# Patient Record
Sex: FEMALE
[De-identification: names, ages, dates, MRNs, and addresses within clinical notes are randomized per-mention and may not be internally consistent; named-entity substitution may affect disease eponyms.]

---

## 2017-06-08 ENCOUNTER — HOSPITAL ENCOUNTER (EMERGENCY)
Dept: HOSPITAL 31 - C.ER | Age: 47
Discharge: HOME | End: 2017-06-08
Payer: COMMERCIAL

## 2017-06-08 VITALS — HEART RATE: 69 BPM | RESPIRATION RATE: 17 BRPM

## 2017-06-08 VITALS — BODY MASS INDEX: 27.3 KG/M2

## 2017-06-08 VITALS — SYSTOLIC BLOOD PRESSURE: 114 MMHG | TEMPERATURE: 98.3 F | DIASTOLIC BLOOD PRESSURE: 83 MMHG

## 2017-06-08 VITALS — OXYGEN SATURATION: 100 %

## 2017-06-08 DIAGNOSIS — R10.30: Primary | ICD-10-CM

## 2017-06-08 LAB
ALBUMIN/GLOB SERPL: 1.6 {RATIO} (ref 1–2.1)
ALP SERPL-CCNC: 72 U/L (ref 38–126)
ALT SERPL-CCNC: 29 U/L (ref 9–52)
AST SERPL-CCNC: 17 U/L (ref 14–36)
BACTERIA #/AREA URNS HPF: (no result) /[HPF]
BASOPHILS # BLD AUTO: 0.1 K/UL (ref 0–0.2)
BASOPHILS NFR BLD: 0.9 % (ref 0–2)
BILIRUB SERPL-MCNC: 0.5 MG/DL (ref 0.2–1.3)
BILIRUB UR-MCNC: NEGATIVE MG/DL
BUN SERPL-MCNC: 10 MG/DL (ref 7–17)
CALCIUM SERPL-MCNC: 8.3 MG/DL (ref 8.6–10.4)
CHLORIDE SERPL-SCNC: 102 MMOL/L (ref 98–107)
CO2 SERPL-SCNC: 22 MMOL/L (ref 22–30)
EOSINOPHIL # BLD AUTO: 0.1 K/UL (ref 0–0.7)
EOSINOPHIL NFR BLD: 1.2 % (ref 0–4)
ERYTHROCYTE [DISTWIDTH] IN BLOOD BY AUTOMATED COUNT: 13.6 % (ref 11.5–14.5)
GLOBULIN SER-MCNC: 2.6 GM/DL (ref 2.2–3.9)
GLUCOSE SERPL-MCNC: 85 MG/DL (ref 65–105)
GLUCOSE UR STRIP-MCNC: NORMAL MG/DL
HCT VFR BLD CALC: 40.5 % (ref 34–47)
KETONES UR STRIP-MCNC: (no result) MG/DL
LEUKOCYTE ESTERASE UR-ACNC: (no result) LEU/UL
LYMPHOCYTES # BLD AUTO: 1.9 K/UL (ref 1–4.3)
LYMPHOCYTES NFR BLD AUTO: 24.5 % (ref 20–40)
MCH RBC QN AUTO: 28.9 PG (ref 27–31)
MCHC RBC AUTO-ENTMCNC: 33 G/DL (ref 33–37)
MCV RBC AUTO: 87.6 FL (ref 81–99)
MONOCYTES # BLD: 0.4 K/UL (ref 0–0.8)
MONOCYTES NFR BLD: 5.7 % (ref 0–10)
NRBC BLD AUTO-RTO: 0 % (ref 0–2)
PH UR STRIP: 5 [PH] (ref 5–8)
PLATELET # BLD: 259 K/UL (ref 130–400)
PMV BLD AUTO: 9.8 FL (ref 7.2–11.7)
POTASSIUM SERPL-SCNC: 4 MMOL/L (ref 3.6–5.2)
PROT SERPL-MCNC: 6.7 G/DL (ref 6.3–8.3)
PROT UR STRIP-MCNC: NEGATIVE MG/DL
RBC # UR STRIP: NEGATIVE /UL
RBC #/AREA URNS HPF: 1 /HPF (ref 0–3)
SODIUM SERPL-SCNC: 135 MMOL/L (ref 132–148)
SP GR UR STRIP: 1.02 (ref 1–1.03)
UROBILINOGEN UR-MCNC: NORMAL MG/DL (ref 0.2–1)
WBC # BLD AUTO: 7.8 K/UL (ref 4.8–10.8)
WBC #/AREA URNS HPF: < 1 /HPF (ref 0–5)

## 2017-06-08 PROCEDURE — 87491 CHLMYD TRACH DNA AMP PROBE: CPT

## 2017-06-08 PROCEDURE — 76830 TRANSVAGINAL US NON-OB: CPT

## 2017-06-08 PROCEDURE — 76856 US EXAM PELVIC COMPLETE: CPT

## 2017-06-08 PROCEDURE — 83690 ASSAY OF LIPASE: CPT

## 2017-06-08 PROCEDURE — 96374 THER/PROPH/DIAG INJ IV PUSH: CPT

## 2017-06-08 PROCEDURE — 87591 N.GONORRHOEAE DNA AMP PROB: CPT

## 2017-06-08 PROCEDURE — 84703 CHORIONIC GONADOTROPIN ASSAY: CPT

## 2017-06-08 PROCEDURE — 99285 EMERGENCY DEPT VISIT HI MDM: CPT

## 2017-06-08 PROCEDURE — 81001 URINALYSIS AUTO W/SCOPE: CPT

## 2017-06-08 PROCEDURE — 80053 COMPREHEN METABOLIC PANEL: CPT

## 2017-06-08 PROCEDURE — 85025 COMPLETE CBC W/AUTO DIFF WBC: CPT

## 2017-06-08 PROCEDURE — 74177 CT ABD & PELVIS W/CONTRAST: CPT

## 2017-06-08 NOTE — US
Pelvic ultrasound 



History: Pelvic pain. 



Comparison: None available. 



Technique: Multi-echo multiplanar sequences were performed through 

pelvis utilizing transabdominal and transvaginal techniques. 



Findings: 



Uterus: 10.4 x 4.7 x 5.9 centimeters.  Anteverted. 



Endometrium measures 7 millimeters. 



Cervix measures 3.2 centimeters. 



No free fluid in the pelvic cul-de-sac. 



Trace free fluid noted within the cervical canal. 



Right ovary: 3.6 x 2.5 x 3.6 centimeters. Normal flow.  Hypoechoic 

cyst measuring 2.2 x 2.6 x 2.3 centimeters. 



Left ovary: 2.5 x 2.0 x 2.4 centimeters. Normal flow. 



Pregnancy status unknown. 



Impression: 



2.4 centimeter right ovarian cyst. 



Trace free fluid noted within the cervical canal.

## 2017-06-08 NOTE — CT
PROCEDURE:  CT Abdomen and Pelvis with oral and  IV contrast.



HISTORY:

low abdominal pain



COMPARISON:

None available.



TECHNIQUE:

Contiguous axial images of the abdomen and pelvis. Oral and IV 

contrast was administered. Coronal and Sagittal reformats generated 

and reviewed. 



Contrast dose: 100 cc Visipaque 320 



Radiation dose:



Total exam DLP = 1024.51 mGy-cm.



This CT exam was performed using one or more of the following dose 

reduction techniques: Automated exposure control, adjustment of the 

mA and/or kV according to patient size, and/or use of iterative 

reconstruction technique.



FINDINGS:





LOWER THORAX:

No visible consolidation, pleural effusion, or pneumothorax.



LIVER:

Unremarkable. 



GALLBLADDER AND BILE DUCTS:

Unremarkable. 



PANCREAS:

Unremarkable. 



SPLEEN:

8 mm probable splenule.  Otherwise unremarkable. 



ADRENALS:

Unremarkable. 



KIDNEYS AND URETERS:

The kidneys enhance symmetrically. No hydronephrosis or obstructing 

renal calculus. 



BLADDER:

Decompressed urinary bladder precludes adequate evaluation.



REPRODUCTIVE:

Uterus is present. 2.1 cm right adnexal cystic lesion, likely ovarian 

cyst. 



APPENDIX:

The appendix appears within normal limits of caliber. No secondary 

signs of acute appendicitis.



BOWEL:

The stomach is nondistended. 



The bowel loops appear within normal limits of caliber without 

evidence of intestinal obstruction. Colonic wall thickening involving 

the distal transverse and left colon worrisome for colitis (i.e. 

infectious, inflammatory, ischemic). 



PERITONEUM:

No significant free fluid. No definite free air.



LYMPH NODES:

No bulky lymphadenopathy identified.



VASCULATURE:

No aortic aneurysm. 



BONES:

No acute osseous abnormality is detected.



OTHER FINDINGS:

Tiny fat containing umbilical hernia. 



IMPRESSION:

Colonic wall thickening involving the distal transverse and left 

colon worrisome for colitis (i.e. infectious, inflammatory, 

ischemic). Correlate clinically. 



2.1 cm right adnexal cystic lesion, likely ovarian cyst.

## 2017-06-08 NOTE — C.PDOC
History Of Present Illness


47 yr old female presents to the ER with complaints of low abdominal pain for 

the past 5 weeks. Patient states she has had multiple PID in the past and this 

feels similar, however the pain got worse last week. Patient states she also 

had some white discharge and went to go see her PMD who gave her Diflucan and 

recommended to follow up with GYN but failed to do so. Patient states the pain 

is getting worse, radiating to back and now feels weak. Patient denies fever, 

chest pain, SOB, nausea, vomiting, diarrhea, constipation or numbness. 


Time Seen by Provider: 06/08/17 08:40


Chief Complaint (Nursing): Abdominal Pain


History Per: Patient


History/Exam Limitations: no limitations


Onset/Duration Of Symptoms: Days (5 weeks)





Past Medical History


Reviewed: Historical Data, Nursing Documentation, Vital Signs


Vital Signs: 


 Last Vital Signs











Temp  98.3 F   06/08/17 16:50


 


Pulse  69   06/08/17 16:50


 


Resp  17   06/08/17 16:50


 


BP  114/83   06/08/17 16:50


 


Pulse Ox  100   06/08/17 18:44














- Medical History


PMH: Anxiety, COPD, Post Traumatic Stress Disorder


Family History: States: No Known Family Hx





- Social History


Hx Tobacco Use: No


Hx Alcohol Use: Yes (''Occassionally'')


Hx Substance Use: No (''Past abuse of Xanax'')





- Immunization History


Hx Tetanus Toxoid Vaccination: Yes


Hx Influenza Vaccination: No


Hx Pneumococcal Vaccination: No





Review Of Systems


Except As Marked, All Systems Reviewed And Found Negative.


Constitutional: Positive for: Weakness.  Negative for: Fever


Cardiovascular: Negative for: Chest Pain


Respiratory: Negative for: Shortness of Breath


Gastrointestinal: Positive for: Abdominal Pain (Low abdominal pain ).  Negative 

for: Nausea, Vomiting, Diarrhea, Constipation


Genitourinary: Positive for: Vaginal Discharge (White)


Musculoskeletal: Positive for: Back Pain (Radiating pain to the back)


Neurological: Negative for: Numbness





Physical Exam





- Physical Exam


Appears: Non-toxic, No Acute Distress


Skin: Warm, Dry, No Rash


Head: Atraumatic, Normacephalic


Oral Mucosa: Moist


Chest: Symmetrical, No Tenderness


Cardiovascular: Rhythm Regular, No Murmur


Respiratory: Normal Breath Sounds, No Rales, No Wheezing


Gastrointestinal/Abdominal: Soft, Tenderness (Lower abdominal tenderness), 

Distention (Mildly ), No Guarding, No Rebound


Pelvic: Normal Speculum Exam, No Vaginal Discharge, Other (No cervicitis. On 

manual exam, patient is tender. )


Extremity: Normal ROM, No Swelling


Neurological/Psych: Oriented x3, Normal Speech, Normal Motor





ED Course And Treatment





- Laboratory Results


Result Diagrams: 


 06/08/17 10:34





 06/08/17 11:29


O2 Sat by Pulse Oximetry: 100





- CT Scan/US


  ** US - Pelvis/Transvaginal


Other Rad Studies (CT/US): Read By Radiologist, Radiology Report Reviewed


CT/US Interpretation: Pelvic ultrasound.  History: Pelvic pain.  Comparison: 

None available.  Technique: Multi-echo multiplanar sequences were performed 

through pelvis utilizing transabdominal and transvaginal techniques.  Findings:

  Uterus: 10.4 x 4.7 x 5.9 centimeters.  Anteverted.  Endometrium measures 7 

millimeters.  Cervix measures 3.2 centimeters.  No free fluid in the pelvic cul-

de-sac.  Trace free fluid noted within the cervical canal.  Right ovary: 3.6 x 

2.5 x 3.6 centimeters. Normal flow.  Hypoechoic cyst measuring 2.2 x 2.6 x 2.3 

centimeters.  Left ovary: 2.5 x 2.0 x 2.4 centimeters. Normal flow.  Pregnancy 

status unknown.  Impression:  2.4 centimeter right ovarian cyst.  Trace free 

fluid noted within the cervical canal.





  ** CT - Abdomen & Pelvis 


Other Rad Studies (CT/US): Read By Radiologist, Radiology Report Reviewed


CT/US Interpretation: PROCEDURE:  CT Abdomen and Pelvis with oral and  IV 

contrast.  HISTORY:  low abdominal pain.  COMPARISON:  None available.  

TECHNIQUE:  Contiguous axial images of the abdomen and pelvis. Oral and IV 

contrast was administered. Coronal and Sagittal reformats generated and 

reviewed.  Contrast dose: 100 cc Visipaque 320.  Radiation dose:  Total exam 

DLP = 1024.51 mGy-cm.  This CT exam was performed using one or more of the 

following dose reduction techniques: Automated exposure control, adjustment of 

the mA and/or kV according to patient size, and/or use of iterative 

reconstruction technique.  FINDINGS:  LOWER THORAX:  No visible consolidation, 

pleural effusion, or pneumothorax.  LIVER:  Unremarkable.  GALLBLADDER AND BILE 

DUCTS:  Unremarkable.  PANCREAS:  Unremarkable.  SPLEEN:  8 mm probable 

splenule.  Otherwise unremarkable.  ADRENALS:  Unremarkable.  KIDNEYS AND 

URETERS:  The kidneys enhance symmetrically. No hydronephrosis or obstructing 

renal calculus.  BLADDER:  Decompressed urinary bladder precludes adequate 

evaluation.  REPRODUCTIVE:  Uterus is present. 2.1 cm right adnexal cystic 

lesion, likely ovarian cyst.  APPENDIX:  The appendix appears within normal 

limits of caliber. No secondary signs of acute appendicitis.  BOWEL:  The 

stomach is nondistended.  The bowel loops appear within normal limits of 

caliber without evidence of intestinal obstruction. Colonic wall thickening 

involving the distal transverse and left colon worrisome for colitis (i.e. 

infectious, inflammatory, ischemic).  PERITONEUM:  No significant free fluid. 

No definite free air.  LYMPH NODES:  No bulky lymphadenopathy identified.  

VASCULATURE:  No aortic aneurysm.  BONES:  No acute osseous abnormality is 

detected.  OTHER FINDINGS:  Tiny fat containing umbilical hernia.  IMPRESSION:  

Colonic wall thickening involving the distal transverse and left colon 

worrisome for colitis (i.e. infectious, inflammatory, ischemic). Correlate 

clinically.  2.1 cm right adnexal cystic lesion, likely ovarian cyst.


Progress Note: casew as d/w Infectious ds  who instructed to start 

patient on levaquin/Avelox  plus Flagyl. Patient was d/c home with f/u with GI 

and OBGYN. Patient verbalized understanding and agreed with the plan of 

discharging.





Medical Decision Making


Medical Decision Making: 


PLAN:


* US - Pelvis/Transvaginal 


* CT - Abd & Pelvis 


* CBC 


* Chlamydia GC 


* HCG 


* Urinalysis 


* Toradol IVP


* Sodium Chloride IV 








Disposition





- Disposition


Disposition: HOME/ ROUTINE


Disposition Time: 16:42


Condition: STABLE


Additional Instructions: 


FOLLOW UP WITH PMD WITHIN 1-2 DAYS. RETURN TO ED IF FEEL WORSE.


Prescriptions: 


metroNIDAZOLE [Flagyl] 500 mg PO TID #42 tab


levoFLOXacin [Levaquin] 750 mg PO DAILY #14 tab


Ibuprofen [Motrin Tab] 600 mg PO Q8 #30 tab


Instructions:  Abdominal Pain (ED)


Forms:  Work Excuse





- Clinical Impression


Clinical Impression: 


 Abdominal pain








- PA / NP / Resident Statement


MD/DO has reviewed & agrees with the documentation as recorded.





- Scribe Statement


The provider has reviewed the documentation as recorded by the Scribe


Barbara Maddox





All medical record entries made by the Scribe were at my direction and 

personally dictated by me. I have reviewed the chart and agree that the record 

accurately reflects my personal performance of the history, physical exam, 

medical decision making, and the department course for this patient. I have 

also personally directed, reviewed, and agree with the discharge instructions 

and disposition.

## 2017-09-03 ENCOUNTER — HOSPITAL ENCOUNTER (EMERGENCY)
Dept: HOSPITAL 31 - C.ER | Age: 47
Discharge: HOME | End: 2017-09-03
Payer: MEDICAID

## 2017-09-03 VITALS — TEMPERATURE: 98.1 F

## 2017-09-03 VITALS
DIASTOLIC BLOOD PRESSURE: 73 MMHG | RESPIRATION RATE: 18 BRPM | OXYGEN SATURATION: 99 % | HEART RATE: 66 BPM | SYSTOLIC BLOOD PRESSURE: 112 MMHG

## 2017-09-03 VITALS — BODY MASS INDEX: 27.3 KG/M2

## 2017-09-03 DIAGNOSIS — Z3A.01: ICD-10-CM

## 2017-09-03 DIAGNOSIS — O46.8X1: Primary | ICD-10-CM

## 2017-09-03 LAB
ALBUMIN/GLOB SERPL: 1.3 {RATIO} (ref 1–2.1)
ALP SERPL-CCNC: 60 U/L (ref 38–126)
ALT SERPL-CCNC: 29 U/L (ref 9–52)
AST SERPL-CCNC: 14 U/L (ref 14–36)
BASOPHILS # BLD AUTO: 0.1 K/UL (ref 0–0.2)
BASOPHILS NFR BLD: 1.3 % (ref 0–2)
BILIRUB SERPL-MCNC: 0.3 MG/DL (ref 0.2–1.3)
BILIRUB UR-MCNC: NEGATIVE MG/DL
BUN SERPL-MCNC: 11 MG/DL (ref 7–17)
CALCIUM SERPL-MCNC: 9.1 MG/DL (ref 8.6–10.4)
CHLORIDE SERPL-SCNC: 103 MMOL/L (ref 98–107)
CO2 SERPL-SCNC: 25 MMOL/L (ref 22–30)
EOSINOPHIL # BLD AUTO: 0.1 K/UL (ref 0–0.7)
EOSINOPHIL NFR BLD: 1.4 % (ref 0–4)
ERYTHROCYTE [DISTWIDTH] IN BLOOD BY AUTOMATED COUNT: 13 % (ref 11.5–14.5)
GLOBULIN SER-MCNC: 2.8 GM/DL (ref 2.2–3.9)
GLUCOSE SERPL-MCNC: 88 MG/DL (ref 65–105)
GLUCOSE UR STRIP-MCNC: NORMAL MG/DL
HCT VFR BLD CALC: 39 % (ref 34–47)
KETONES UR STRIP-MCNC: NEGATIVE MG/DL
LEUKOCYTE ESTERASE UR-ACNC: (no result) LEU/UL
LYMPHOCYTES # BLD AUTO: 1.7 K/UL (ref 1–4.3)
LYMPHOCYTES NFR BLD AUTO: 20 % (ref 20–40)
MCH RBC QN AUTO: 28.5 PG (ref 27–31)
MCHC RBC AUTO-ENTMCNC: 32.7 G/DL (ref 33–37)
MCV RBC AUTO: 87.2 FL (ref 81–99)
MONOCYTES # BLD: 0.5 K/UL (ref 0–0.8)
MONOCYTES NFR BLD: 5.5 % (ref 0–10)
NRBC BLD AUTO-RTO: 0 % (ref 0–2)
PH UR STRIP: 5 [PH] (ref 5–8)
PLATELET # BLD: 234 K/UL (ref 130–400)
PMV BLD AUTO: 9.6 FL (ref 7.2–11.7)
POTASSIUM SERPL-SCNC: 4.1 MMOL/L (ref 3.6–5.2)
PROT SERPL-MCNC: 6.5 G/DL (ref 6.3–8.3)
PROT UR STRIP-MCNC: (no result) MG/DL
RBC # UR STRIP: (no result) /UL
RBC #/AREA URNS HPF: 1236 /HPF (ref 0–3)
SODIUM SERPL-SCNC: 139 MMOL/L (ref 132–148)
SP GR UR STRIP: 1.02 (ref 1–1.03)
UROBILINOGEN UR-MCNC: NORMAL MG/DL (ref 0.2–1)
WBC # BLD AUTO: 8.3 K/UL (ref 4.8–10.8)
WBC #/AREA URNS HPF: 1 /HPF (ref 0–5)

## 2017-09-03 PROCEDURE — 86850 RBC ANTIBODY SCREEN: CPT

## 2017-09-03 PROCEDURE — 76856 US EXAM PELVIC COMPLETE: CPT

## 2017-09-03 PROCEDURE — 96360 HYDRATION IV INFUSION INIT: CPT

## 2017-09-03 PROCEDURE — 81001 URINALYSIS AUTO W/SCOPE: CPT

## 2017-09-03 PROCEDURE — 99284 EMERGENCY DEPT VISIT MOD MDM: CPT

## 2017-09-03 PROCEDURE — 84702 CHORIONIC GONADOTROPIN TEST: CPT

## 2017-09-03 PROCEDURE — 86900 BLOOD TYPING SEROLOGIC ABO: CPT

## 2017-09-03 PROCEDURE — 80053 COMPREHEN METABOLIC PANEL: CPT

## 2017-09-03 PROCEDURE — 76830 TRANSVAGINAL US NON-OB: CPT

## 2017-09-03 PROCEDURE — 84703 CHORIONIC GONADOTROPIN ASSAY: CPT

## 2017-09-03 PROCEDURE — 85025 COMPLETE CBC W/AUTO DIFF WBC: CPT

## 2017-09-03 NOTE — US
HISTORY:

pregnant/bleeding, last menstrual period is reported 08/01/2017. 



COMPARISON:

And pelvis CT examination with contrast 06/08/2017 and transvaginal 

pelvic ultrasonography on the same date.



TECHNIQUE:

Transabdominal and transvaginal pelvic ultrasound were performed for 

evaluation of pregnancy and spontaneous vaginal bleeding.



FINDINGS:



UTERUS:

Measures 10.1 x 4.7 x 5.4 cm. Normal in size and appearance. No 

fibroid or other mass lesion seen.



ENDOMETRIUM:

Measures 8.1 mm in diameter. No intrauterine gestation is identified. 



CERVIX:

No cervical abnormality identified.



RIGHT OVARY:

Measures 2.3 x 1.3 x 2.3 cm. No solid mass. Normal flow. 



LEFT OVARY:

Measures 2.2 x 1.1 x 2.0 cm. No solid mass. Normal flow. 



FREE FLUID:

No significant free fluid noted.



OTHER FINDINGS:

None. 



IMPRESSION:

No intrauterine gestation is appreciate this time. There is no 

definite pattern to indicate an ectopic gestation either. An ectopic 

gestation is not excluded however, given patient's last menstrual 

period suggesting an gestational age of 4 weeks 5 days, an early 

intrauterine gestation remains of a possibility, simply not visualize 

currently. Clinical and sonographic follow-up are advised.

## 2017-09-03 NOTE — C.PDOC
History Of Present Illness


47 year old female presents to the ED with complaints of spotting beginning 

yesterday, and slight worsening today and pelvic cramping. Patient's LMP was 

 and in early desirable pregnancy. She is  and had one . 

Patient denies fever, nausea, or vomiting. 


Time Seen by Provider: 17 09:59


Chief Complaint (Nursing): Female Genitourinary


History Per: Patient


History/Exam Limitations: no limitations


Onset/Duration Of Symptoms: Days (1 day )


Current Symptoms Are (Timing): Still Present


Quality Of Discomfort: Cramping


Associated Symptoms: denies: Fever, Chills, Nausea, Vomiting, Diarrhea


Recent travel outside of the [place]: No


Abnormal Vaginal Bleeding: Yes


Last Menstral Period:  


: 3


Para: 1


Miscarriage: 1 ( )





Past Medical History


Reviewed: Historical Data, Nursing Documentation, Vital Signs


Vital Signs: 


 Last Vital Signs











Temp  98.1 F   17 11:30


 


Pulse  66   17 11:30


 


Resp  18   17 11:30


 


BP  112/73   17 11:30


 


Pulse Ox  99   17 13:34














- Medical History


PMH: Anxiety, COPD, Post Traumatic Stress Disorder


Family History: States: Unknown Family Hx





- Social History


Hx Tobacco Use: No


Hx Alcohol Use: Yes (''Occassionally'')


Hx Substance Use: No (''Past abuse of Xanax'')





- Immunization History


Hx Tetanus Toxoid Vaccination: Yes


Hx Influenza Vaccination: No


Hx Pneumococcal Vaccination: No





Review Of Systems


Constitutional: Negative for: Fever, Chills


Cardiovascular: Negative for: Chest Pain, Palpitations


Respiratory: Negative for: Cough, Shortness of Breath


Gastrointestinal: Negative for: Nausea, Vomiting, Diarrhea


Genitourinary: Positive for: Vaginal Bleeding, Pelvic Pain (cramping )





Physical Exam





- Physical Exam


Appears: Non-toxic, No Acute Distress


Skin: Warm, Dry


Head: Atraumatic


Eye(s): bilateral: Normal Inspection


Oral Mucosa: Moist


Neck: Supple


Chest: Symmetrical, No Deformity


Cardiovascular: Rhythm Regular


Respiratory: Normal Breath Sounds, No Rales, No Rhonchi, No Wheezing


Gastrointestinal/Abdominal: Soft, Tenderness (mild suprapubic tenderness ), No 

Distention, No Guarding, No Rebound


Neurological/Psych: Oriented x3, Normal Speech, Normal Cognition





ED Course And Treatment





- Laboratory Results


Result Diagrams: 


 17 10:30





 17 10:30


O2 Sat by Pulse Oximetry: 99 (room air )





- CT Scan/US


  ** Pelvic US


Other Rad Studies (CT/US): Read By Radiologist, Radiology Report Reviewed


CT/US Interpretation: Accession No. : [id number].  Patient Name / ID : 

RUPA CHOW  / [id number].  Exam Date : 2017 10:31:16 ( Approved ).  

Study Comment :  Sex / Age : F  / 47Y.  Creator : Sergio Mills MD.  

Dictator : Sergio Mills MD.   :  Approver : Sergio Mills MD.  Approver2 :  Report Date : 2017 11:11:50.  My Comment :  ****

*******************************************************************************

.  HISTORY:  pregnant/bleeding, last menstrual period is reported 2017.  

COMPARISON:  And pelvis CT examination with contrast 2017 and 

transvaginal pelvic ultrasonography on the same date.  TECHNIQUE:  

Transabdominal and transvaginal pelvic ultrasound were performed for evaluation 

of pregnancy and spontaneous vaginal bleeding.  FINDINGS:  UTERUS:  Measures 

10.1 x 4.7 x 5.4 cm. Normal in size and appearance. No fibroid or other mass 

lesion seen.  ENDOMETRIUM:  Measures 8.1 mm in diameter. No intrauterine 

gestation is identified.  CERVIX:  No cervical abnormality identified.  RIGHT 

OVARY:  Measures 2.3 x 1.3 x 2.3 cm. No solid mass. Normal flow.  LEFT OVARY:  

Measures 2.2 x 1.1 x 2.0 cm. No solid mass. Normal flow.  FREE FLUID:  No 

significant free fluid noted.  OTHER FINDINGS:  None.  IMPRESSION:  No 

intrauterine gestation is appreciate this time. There is no definite pattern to 

indicate an ectopic gestation either. An ectopic gestation is not excluded 

however, given patient's last menstrual period suggesting an gestational age of 

4 weeks 5 days, an early intrauterine gestation remains of a possibility, 

simply not visualize currently. Clinical and sonographic follow-up are advised.





- Physician Consult Information


Time Consulting Physician Contacted: 10:00


Physician Contacted: Lisa Sloan


Outcome Of Conversation: Case discussed with Dr. Sloan and US results were 

discussed. Dr. Sloan agrees it was most likely early pregnancy, still 

possibility og ectopic pregnancy and suggested patient follow up in 48 hours 

for repeat Beta.





Disposition





- Disposition


Disposition: HOME/ ROUTINE


Disposition Time: 12:32


Condition: STABLE


Additional Instructions: 


Follow up with PMD/OBGYN within 1-2 days. REturn to ED in 48 h to repeat Beta 

HCG. Return to ED immediately if feel worse.


Instructions:  First Trimester Vaginal Bleed (ED)


Forms:  Work/School/Gym Excuse, [employer] (English)





- Clinical Impression


Clinical Impression: 


 Vaginal bleeding during pregnancy, antepartum








- Scribe Statement


The provider has reviewed the documentation as recorded by the Scribe





Jessy Buckley





All medical record entries made by the Scribe were at my direction and 

personally dictated by me. I have reviewed the chart and agree that the record 

accurately reflects my personal performance of the history, physical exam, 

medical decision making, and the department course for this patient. I have 

also personally directed, reviewed, and agree with the discharge instructions 

and disposition.

## 2017-09-06 ENCOUNTER — HOSPITAL ENCOUNTER (EMERGENCY)
Dept: HOSPITAL 31 - C.ER | Age: 47
Discharge: HOME | End: 2017-09-06
Payer: COMMERCIAL

## 2017-09-06 VITALS — BODY MASS INDEX: 27.3 KG/M2

## 2017-09-06 VITALS — TEMPERATURE: 98.1 F | OXYGEN SATURATION: 98 %

## 2017-09-06 VITALS — RESPIRATION RATE: 20 BRPM | DIASTOLIC BLOOD PRESSURE: 81 MMHG | HEART RATE: 80 BPM | SYSTOLIC BLOOD PRESSURE: 129 MMHG

## 2017-09-06 DIAGNOSIS — N93.8: Primary | ICD-10-CM

## 2017-09-06 NOTE — C.PDOC
History Of Present Illness


Patient is a 47 year old female presents to ED for repeat Beta. Prior records 

reviewed, pt was seen here 3 days ago. Pregnancy test was positive but Beta was 

11 at that time. Pelvic ultrasound from 9/3/17 showed no intrauterine 

gestation. Pt states she continues to have vaginal bleeding, and crampy lower 

abdominal pain. No nausea, vomiting, diarrhea, or fever. 





Time Seen by Provider: 09/06/17 07:36


Chief Complaint (Nursing): Abdominal Pain


History Per: Patient


History/Exam Limitations: no limitations


Onset/Duration Of Symptoms: Days


Current Symptoms Are (Timing): Still Present


Location Of Pain/Discomfort: Suprapubic


Radiation Of Pain To:: None


Quality Of Discomfort: Cramping


Associated Symptoms: denies: Loss Of Appetite, Back Pain, Chest Pain, 

Constipation, Urinary Symptoms


Exacerbating Factors: None


Alleviating Factors: None


Recent travel outside of the [place]: No


Additional History Per: Patient


Abnormal Vaginal Bleeding: Yes





Past Medical History


Reviewed: Historical Data, Nursing Documentation, Vital Signs


Vital Signs: 


 Last Vital Signs











Temp  98.1 F   09/06/17 07:05


 


Pulse  80   09/06/17 09:15


 


Resp  20   09/06/17 09:15


 


BP  129/81   09/06/17 09:15


 


Pulse Ox  98   09/06/17 09:16














- Medical History


PMH: Anxiety, COPD, Post Traumatic Stress Disorder


Family History: States: Unknown Family Hx





- Social History


Hx Tobacco Use: No


Hx Alcohol Use: No (''Occassionally'')


Hx Substance Use: No (''Past abuse of Xanax'')





- Immunization History


Hx Tetanus Toxoid Vaccination: Yes


Hx Influenza Vaccination: No


Hx Pneumococcal Vaccination: No





Review Of Systems


Except As Marked, All Systems Reviewed And Found Negative.


Constitutional: Negative for: Fever, Chills


Gastrointestinal: Positive for: Abdominal Pain.  Negative for: Nausea, Vomiting

, Diarrhea, Constipation


Genitourinary: Positive for: Vaginal Bleeding.  Negative for: Dysuria


Musculoskeletal: Negative for: Back Pain





Physical Exam





- Physical Exam


Appears: Non-toxic, No Acute Distress


Skin: Normal Color, Warm, Dry


Head: Atraumatic, Normacephalic


Oral Mucosa: Moist


Cardiovascular: Rhythm Regular, No Murmur


Respiratory: Normal Breath Sounds, No Rales, No Rhonchi, No Wheezing


Gastrointestinal/Abdominal: Soft, Tenderness (suprapubic), No Guarding, No 

Rebound


Extremity: Normal ROM


Neurological/Psych: Oriented x3, Normal Speech





ED Course And Treatment


O2 Sat by Pulse Oximetry: 98 (on RA)


Pulse Ox Interpretation: Normal


Progress Note: Beta-HCG ordered and reviewed.  Beta  Hcg 5.  On re-evaluation 

abdomen soft non-tender


Reassessment Condition: Unchanged





Medical Decision Making


Medical Decision Making: 





Patient advised to follow up with GYN or clinic for further evaluation





Impression DUB vs Miscarrage





Disposition





- Disposition


Referrals: 


[facility] [Outside]


[facility] [Outside]


Disposition: HOME/ ROUTINE


Disposition Time: 09:20


Condition: STABLE


Additional Instructions: 


Return to ED if any increase symptoms


Follow up with PMD or clinic for further evaluation


Instructions:  Dysfunctional Uterine Bleeding (ED)


Forms:  [employer] (English), Work Excuse





- POA


Present On Arrival: None





- Clinical Impression


Clinical Impression: 


 DUB (dysfunctional uterine bleeding)








- PA / NP / Resident Statement


MD/DO has reviewed & agrees with the documentation as recorded.





- Scribe Statement


The provider has reviewed the documentation as recorded by the Scribwesley Sloan





All medical record entries made by the Rellibwesley were at my direction and 

personally dictated by me. I have reviewed the chart and agree that the record 

accurately reflects my personal performance of the history, physical exam, 

medical decision making, and the department course for this patient. I have 

also personally directed, reviewed, and agree with the discharge instructions 

and disposition.

## 2017-11-16 ENCOUNTER — HOSPITAL ENCOUNTER (EMERGENCY)
Dept: HOSPITAL 31 - C.ER | Age: 47
Discharge: HOME | End: 2017-11-16
Payer: COMMERCIAL

## 2017-11-16 VITALS — RESPIRATION RATE: 18 BRPM | HEART RATE: 60 BPM | DIASTOLIC BLOOD PRESSURE: 75 MMHG | SYSTOLIC BLOOD PRESSURE: 115 MMHG

## 2017-11-16 VITALS — BODY MASS INDEX: 27.3 KG/M2

## 2017-11-16 VITALS — OXYGEN SATURATION: 98 %

## 2017-11-16 VITALS — TEMPERATURE: 97.8 F

## 2017-11-16 DIAGNOSIS — K59.00: Primary | ICD-10-CM

## 2017-11-16 DIAGNOSIS — R10.9: ICD-10-CM

## 2017-11-16 LAB
ALBUMIN/GLOB SERPL: 1.2 {RATIO} (ref 1–2.1)
ALP SERPL-CCNC: 69 U/L (ref 38–126)
ALT SERPL-CCNC: 48 U/L (ref 9–52)
AST SERPL-CCNC: 21 U/L (ref 14–36)
BASOPHILS # BLD AUTO: 0 K/UL (ref 0–0.2)
BASOPHILS NFR BLD: 0.5 % (ref 0–2)
BILIRUB SERPL-MCNC: 0.6 MG/DL (ref 0.2–1.3)
BILIRUB UR-MCNC: NEGATIVE MG/DL
BUN SERPL-MCNC: 12 MG/DL (ref 7–17)
CALCIUM SERPL-MCNC: 9 MG/DL (ref 8.6–10.4)
CHLORIDE SERPL-SCNC: 99 MMOL/L (ref 98–107)
CO2 SERPL-SCNC: 23 MMOL/L (ref 22–30)
EOSINOPHIL # BLD AUTO: 0.1 K/UL (ref 0–0.7)
EOSINOPHIL NFR BLD: 1.2 % (ref 0–4)
ERYTHROCYTE [DISTWIDTH] IN BLOOD BY AUTOMATED COUNT: 13.5 % (ref 11.5–14.5)
GLOBULIN SER-MCNC: 3.7 GM/DL (ref 2.2–3.9)
GLUCOSE SERPL-MCNC: 77 MG/DL (ref 65–105)
GLUCOSE UR STRIP-MCNC: NORMAL MG/DL
HCT VFR BLD CALC: 39.7 % (ref 34–47)
KETONES UR STRIP-MCNC: NEGATIVE MG/DL
LEUKOCYTE ESTERASE UR-ACNC: (no result) LEU/UL
LYMPHOCYTES # BLD AUTO: 2.2 K/UL (ref 1–4.3)
LYMPHOCYTES NFR BLD AUTO: 24.7 % (ref 20–40)
MCH RBC QN AUTO: 29.1 PG (ref 27–31)
MCHC RBC AUTO-ENTMCNC: 33.7 G/DL (ref 33–37)
MCV RBC AUTO: 86.4 FL (ref 81–99)
MONOCYTES # BLD: 0.6 K/UL (ref 0–0.8)
MONOCYTES NFR BLD: 6.4 % (ref 0–10)
NRBC BLD AUTO-RTO: 0 % (ref 0–2)
PH UR STRIP: 5 [PH] (ref 5–8)
PLATELET # BLD: 260 K/UL (ref 130–400)
PMV BLD AUTO: 9.5 FL (ref 7.2–11.7)
POTASSIUM SERPL-SCNC: 3.8 MMOL/L (ref 3.6–5.2)
PROT SERPL-MCNC: 8.3 G/DL (ref 6.3–8.3)
PROT UR STRIP-MCNC: NEGATIVE MG/DL
RBC # UR STRIP: NEGATIVE /UL
RBC #/AREA URNS HPF: < 1 /HPF (ref 0–3)
SODIUM SERPL-SCNC: 134 MMOL/L (ref 132–148)
SP GR UR STRIP: 1.01 (ref 1–1.03)
UROBILINOGEN UR-MCNC: NORMAL MG/DL (ref 0.2–1)
WBC # BLD AUTO: 8.9 K/UL (ref 4.8–10.8)

## 2017-11-16 PROCEDURE — 83690 ASSAY OF LIPASE: CPT

## 2017-11-16 PROCEDURE — 96374 THER/PROPH/DIAG INJ IV PUSH: CPT

## 2017-11-16 PROCEDURE — 81001 URINALYSIS AUTO W/SCOPE: CPT

## 2017-11-16 PROCEDURE — 80053 COMPREHEN METABOLIC PANEL: CPT

## 2017-11-16 PROCEDURE — 84703 CHORIONIC GONADOTROPIN ASSAY: CPT

## 2017-11-16 PROCEDURE — 96375 TX/PRO/DX INJ NEW DRUG ADDON: CPT

## 2017-11-16 PROCEDURE — 99285 EMERGENCY DEPT VISIT HI MDM: CPT

## 2017-11-16 PROCEDURE — 85025 COMPLETE CBC W/AUTO DIFF WBC: CPT

## 2017-11-16 PROCEDURE — 74022 RADEX COMPL AQT ABD SERIES: CPT

## 2017-11-16 PROCEDURE — 96361 HYDRATE IV INFUSION ADD-ON: CPT

## 2017-11-16 PROCEDURE — 74177 CT ABD & PELVIS W/CONTRAST: CPT

## 2017-11-16 NOTE — CT
PROCEDURE:  CT Abdomen and Pelvis with contrast



HISTORY:

lower abd pain, distended, diarrhea



COMPARISON:

Obstructive series performed 6/8/17, pelvic ultrasound performed 

9/3/17 



TECHNIQUE:

Contrast dose: 100 cc Visipaque 320 



Radiation dose:



Total exam DLP = 959.84 MGy-cm.



This CT exam was performed using one or more of the following dose 

reduction techniques: Automated exposure control, adjustment of the 

mA and/or kV according to patient size, and/or use of iterative 

reconstruction technique.



FINDINGS:



LOWER THORAX:

No visible consolidation, pleural effusion, or pneumothorax.



LIVER:

Unremarkable.  



GALLBLADDER AND BILE DUCTS:

Unremarkable. 



PANCREAS:

Unremarkable. 



SPLEEN:

Unremarkable. 



ADRENALS:

Unremarkable. 



KIDNEYS AND URETERS:

Kidneys enhance symmetrically.  No hydronephrosis or obstructing 

calculus identified. 



VASCULATURE:

No aortic aneurysm. 



BOWEL:

Stomach is nondistended.  



Lack of oral contrast limits evaluation for bowel pathology.  Bowel 

loops appear within normal limits of caliber without evidence of 

obstruction.



APPENDIX:

The appendix appears within normal limits of caliber. No secondary 

signs of acute appendicitis.



PERITONEUM:

No significant free fluid. No definite free air. 



LYMPH NODES:

No bulky adenopathy identified. 



BLADDER:

Decompressed urinary bladder limits evaluation. 



REPRODUCTIVE:

Uterus is present. The cervix appears enlarged/ bulky ; suggest 

further evaluation with pelvic ultrasound and Pap smear. 



BONES:

No acute osseous abnormality is detected. 



OTHER FINDINGS:

None.



IMPRESSION:

Mild constipation. 



The cervix appears enlarged/ bulky ; suggest further evaluation with 

pelvic exam and Pap smear.

## 2017-11-16 NOTE — C.PDOC
History Of Present Illness


47 year old female, whose PMHx includes colitis, presents to the ED for 

evaluation of cramping abdominal pain which began around 4 days ago. Patient 

states her pain occasionally radiates into her back, is associated with 

diarrhea (around 6 episodes of loose diarrhea per day), and hot/cold flashes. 

Patient states she was recently evaluated by her GYN for a Pap Smear and was 

advised to come to the ED for further evaluation. Patient denies fever, chills, 

nausea, vomiting. 


Time Seen by Provider: 11/16/17 12:05


Chief Complaint (Nursing): Abdominal Pain


History Per: Patient


History/Exam Limitations: no limitations


Current Symptoms Are (Timing): Still Present


Quality Of Discomfort: Cramping, "Pain"


Associated Symptoms: Diarrhea.  denies: Nausea, Vomiting





Past Medical History


Reviewed: Historical Data, Nursing Documentation, Vital Signs


Vital Signs: 


 Last Vital Signs











Temp  97.8 F   11/16/17 10:58


 


Pulse  60   11/16/17 14:58


 


Resp  18   11/16/17 14:58


 


BP  115/75   11/16/17 14:58


 


Pulse Ox  98   11/16/17 15:04














- Medical History


PMH: Anxiety, COPD, Post Traumatic Stress Disorder


Surgical History: No Surg Hx


Family History: States: Unknown Family Hx





- Social History


Hx Tobacco Use: No


Hx Alcohol Use: No (''Occassionally'')


Hx Substance Use: No (''Past abuse of Xanax'')





- Immunization History


Hx Tetanus Toxoid Vaccination: Yes


Hx Influenza Vaccination: No


Hx Pneumococcal Vaccination: No





Physical Exam





- Physical Exam


Appears: Non-toxic, No Acute Distress


Skin: Normal Color, Warm, Dry


Eye(s): bilateral: Normal Inspection


Oral Mucosa: Moist


Neck: Supple


Chest: Symmetrical, No Deformity, No Tenderness


Cardiovascular: Rhythm Regular, No Murmur


Respiratory: Normal Breath Sounds, No Rales, No Rhonchi, No Wheezing


Gastrointestinal/Abdominal: No Soft (firm ), Tenderness (lower abdomen), 

Distention, No Guarding, No Rebound


Back: Normal Inspection, No Vertebral Tenderness, No Paraspinal Tenderness


Pelvic: Normal External Exam


Extremity: Normal ROM, Capillary Refill (less than 2 seconds )


Neurological/Psych: Oriented x3, Normal Speech, Normal Cognition


Gait: Steady





ED Course And Treatment





- Laboratory Results


Result Diagrams: 


 11/16/17 13:02





 11/16/17 13:02


O2 Sat by Pulse Oximetry: 98 (on RA)


Pulse Ox Interpretation: Normal





- Other Rad


  ** Abdomen Obstructive Series XR


X-Ray: Interpreted by Me, Viewed By Me, Read By Radiologist


Interpretation: PROCEDURE:  Radiographs of the chest and abdomen (obstructive 

series).  HISTORY:  abd pain.  COMPARISON:  CT abdomen and pelvis with contrast 

performed 6/8/17.  TECHNIQUE:  AP radiograph of the chest, with upright and 

supine radiographs of the abdomen.  FINDINGS:  Examination limited by habitus.  

CHEST:  Heart size appears within normal limits.  No focal consolidation, 

significant pleural effusion, or definite pneumothorax identified.Please note 

that chest x-ray has limited sensitivity for the detection of pulmonary masses.

  ABDOMEN AND PELVIS:  Nonobstructive bowel gas pattern. No definite free air. 

Mild constipation.  No acute osseous abnormality is detected.  IMPRESSION:  

Mild constipation.





- CT Scan/US


  ** CT A/P


Other Rad Studies (CT/US): Interpreted By Me, Read By Radiologist, Radiology 

Report Reviewed


CT/US Interpretation: PROCEDURE:  CT Abdomen and Pelvis with contrast.  HISTORY

:  lower abd pain, distended, diarrhea.  COMPARISON:  Obstructive series 

performed 6/8/17, pelvic ultrasound performed 9/3/17.  TECHNIQUE:  Contrast dose

: 100 cc Visipaque 320.  Radiation dose:  Total exam DLP = 959.84 MGy-cm.  This 

CT exam was performed using one or more of the following dose reduction 

techniques: Automated exposure control, adjustment of the mA and/or kV 

according to patient size, and/or use of iterative reconstruction technique.  

FINDINGS:  LOWER THORAX:  No visible consolidation, pleural effusion, or 

pneumothorax.  LIVER:  Unremarkable.  GALLBLADDER AND BILE DUCTS:  

Unremarkable.  PANCREAS:  Unremarkable.  SPLEEN:  Unremarkable.  ADRENALS:  

Unremarkable.  KIDNEYS AND URETERS:  Kidneys enhance symmetrically.  No 

hydronephrosis or obstructing calculus identified.  VASCULATURE:  No aortic 

aneurysm.  BOWEL:  Stomach is nondistended.  Lack of oral contrast limits 

evaluation for bowel pathology.  Bowel loops appear within normal limits of 

caliber without evidence of obstruction.  APPENDIX:  The appendix appears 

within normal limits of caliber. No secondary signs of acute appendicitis.  

PERITONEUM:  No significant free fluid. No definite free air.  LYMPH NODES:  No 

bulky adenopathy identified.  BLADDER:  Decompressed urinary bladder limits 

evaluation.  REPRODUCTIVE:  Uterus is present. The cervix appears enlarged/ 

bulky ; suggest further evaluation with pelvic ultrasound and Pap smear.  BONES

:  No acute osseous abnormality is detected.  OTHER FINDINGS:  None.  IMPRESSION

:  Mild constipation.  The cervix appears enlarged/ bulky ; suggest further 

evaluation with pelvic exam and Pap smear.





Medical Decision Making


Medical Decision Making: 





Bloodwork, urinalysis, CT A/P, Obstructive Series Abdomen. 


Pepcid IVP, Toradol IVP, Zofran IVP, and IV Fluids administered. 





Disposition





- Disposition


Referrals: 


[facility] [Outside]


Disposition: HOME/ ROUTINE


Disposition Time: 15:22


Condition: GOOD


Additional Instructions: 


Follow up with the medical doctor within 1-2 days, Return if worsened. 


Prescriptions: 


Ibuprofen [Motrin] 600 mg PO TID #21 tab


Polyethylene Glycol 3350 [Miralax] 17 gm PO DAILY PRN #100 ml


 PRN Reason: Constipation


Instructions:  Constipation (DC), High Fiber Diet (ED)


Forms:  [facility] (English)





- Clinical Impression


Clinical Impression: 


 Abdominal pain, Constipation








- PA / NP / Resident Statement


MD/DO has reviewed & agrees with the documentation as recorded.





- Scribe Statement


The provider has reviewed the documentation as recorded by the Scribe (Dariela Sloan)








All medical record entries made by the Scribe were at my direction and 

personally dictated by me. I have reviewed the chart and agree that the record 

accurately reflects my personal performance of the history, physical exam, 

medical decision making, and the department course for this patient. I have 

also personally directed, reviewed, and agree with the discharge instructions 

and disposition.

## 2017-11-16 NOTE — RAD
PROCEDURE:  Radiographs of the chest and abdomen (obstructive series)



HISTORY:

abd pain  



COMPARISON:

CT abdomen and pelvis with contrast performed 6/8/17



TECHNIQUE:

AP radiograph of the chest, with upright and supine radiographs of 

the abdomen.



FINDINGS:

Examination limited by habitus. 



CHEST:

Heart size appears within normal limits. 



No focal consolidation, significant pleural effusion, or definite 

pneumothorax identified.Please note that chest x-ray has limited 

sensitivity for the detection of pulmonary masses.



ABDOMEN AND PELVIS:

Nonobstructive bowel gas pattern. No definite free air. Mild 

constipation. 



No acute osseous abnormality is detected. 



IMPRESSION:

Mild constipation.

## 2018-04-28 ENCOUNTER — HOSPITAL ENCOUNTER (EMERGENCY)
Dept: HOSPITAL 31 - C.ER | Age: 48
Discharge: HOME | End: 2018-04-28
Payer: MEDICAID

## 2018-04-28 VITALS
RESPIRATION RATE: 18 BRPM | SYSTOLIC BLOOD PRESSURE: 130 MMHG | HEART RATE: 84 BPM | OXYGEN SATURATION: 96 % | DIASTOLIC BLOOD PRESSURE: 87 MMHG | TEMPERATURE: 97.5 F

## 2018-04-28 VITALS — BODY MASS INDEX: 33.4 KG/M2

## 2018-04-28 DIAGNOSIS — N72: Primary | ICD-10-CM

## 2018-04-28 DIAGNOSIS — N89.8: ICD-10-CM

## 2018-04-28 LAB
BILIRUB UR-MCNC: NEGATIVE MG/DL
GLUCOSE UR STRIP-MCNC: NORMAL MG/DL
HCG,QUALITATIVE URINE: NEGATIVE
LEUKOCYTE ESTERASE UR-ACNC: (no result) LEU/UL
PH UR STRIP: 5 [PH] (ref 5–8)
PROT UR STRIP-MCNC: NEGATIVE MG/DL
RBC # UR STRIP: NEGATIVE /UL
SP GR UR STRIP: 1.02 (ref 1–1.03)
SQUAMOUS EPITHIAL: 7 /HPF (ref 0–5)
UROBILINOGEN UR-MCNC: NORMAL MG/DL (ref 0.2–1)

## 2018-04-28 NOTE — C.PDOC
History Of Present Illness


48 year old female presents to the emergency department with complaints of 

pelvic discomfort and vaginal itching with discharge persisting for the past 

two weeks. Patient states that she has a previous history of pelvic 

inflammatory disease, and she denies fever, nausea, vomiting, diarrhea, and 

vaginal bleeding. 


Time Seen by Provider: 04/28/18 08:17


Chief Complaint (Nursing): Female Genitourinary


History Per: Patient


Onset/Duration Of Symptoms: Other (2 weeks)


Current Symptoms Are (Timing): Still Present


Associated Symptoms: denies: Fever, Nausea, Vomiting, Diarrhea





Past Medical History


Reviewed: Historical Data, Nursing Documentation, Vital Signs


Vital Signs: 


 Last Vital Signs











Temp  97.5 F L  04/28/18 08:28


 


Pulse  84   04/28/18 08:28


 


Resp  18   04/28/18 08:28


 


BP  130/87   04/28/18 08:28


 


Pulse Ox  96   04/28/18 11:04














- Medical History


PMH: Anxiety, COPD, Post Traumatic Stress Disorder


Surgical History: No Surg Hx


Family History: States: No Known Family Hx





- Social History


Hx Tobacco Use: No


Hx Alcohol Use: No (''Occassionally'')


Hx Substance Use: No (''Past abuse of Xanax'')





- Immunization History


Hx Tetanus Toxoid Vaccination: No


Hx Influenza Vaccination: No


Hx Pneumococcal Vaccination: No





Review Of Systems


Except As Marked, All Systems Reviewed And Found Negative.


Genitourinary: Positive for: Vaginal Discharge, Pelvic Pain (discomfort), Other 

(vaginal itch)





Physical Exam





- Physical Exam


Appears: Non-toxic, No Acute Distress, Other (comfortable)


Cardiovascular: Rhythm Regular


Respiratory: Normal Breath Sounds


Gastrointestinal/Abdominal: Normal Exam, Soft, No Tenderness


Pelvic: No Vaginal Bleeding, Vaginal Discharge (thick, white), No Cervical 

Motion Tenderness, No Adnexal Tenderness, No Other (lesions)





ED Course And Treatment


O2 Sat by Pulse Oximetry: 96 (RA)


Pulse Ox Interpretation: Normal


Progress Note: Plan: Urinalysis results are negative. Patient prescribed 

antibiotics and is clear for discharge home.





Disposition


Counseled Patient/Family Regarding: Diagnosis, Need For Followup, Rx Given





- Disposition


Referrals: 


Ronaldo Medina MD [Staff Provider] - 


Disposition: HOME/ ROUTINE


Disposition Time: 09:15


Condition: STABLE


Additional Instructions: 


FOLLOW UP WITH YOUR GYN AS SCHEDULED THIS WEEK





USE ANTIBIOTICS UNIL FINISHED 





RETURN TO ER IF SYMPTOMS WORSEN


Prescriptions: 


levoFLOXacin [Levaquin] 500 mg PO BID #14 tab


metroNIDAZOLE [Flagyl] 500 mg PO BID #14 tab


Instructions:  Vaginal Discharge in Adults


Forms:  [facility] (English)


Print Language: ENGLISH





- POA


Present On Arrival: None





- Clinical Impression


Clinical Impression: 


 Vaginal discharge, Cervicitis








- Scribe Statement


The provider has reviewed the documentation as recorded by the Scribe (Gomez Cano)


Provider Attestation: 


All medical record entries made by the Scribe were at my direction and 

personally dictated by me. I have reviewed the chart and agree that the record 

accurately reflects my personal performance of the history, physical exam, 

medical decision making, and the department course for this patient. I have 

also personally directed, reviewed, and agree with the discharge instructions 

and disposition.

## 2018-07-26 ENCOUNTER — HOSPITAL ENCOUNTER (OUTPATIENT)
Dept: HOSPITAL 31 - C.ER | Age: 48
Setting detail: OBSERVATION
LOS: 4 days | Discharge: HOME | End: 2018-07-30
Attending: INTERNAL MEDICINE | Admitting: INTERNAL MEDICINE
Payer: COMMERCIAL

## 2018-07-26 VITALS — BODY MASS INDEX: 31.4 KG/M2

## 2018-07-26 DIAGNOSIS — Z86.711: ICD-10-CM

## 2018-07-26 DIAGNOSIS — M19.90: ICD-10-CM

## 2018-07-26 DIAGNOSIS — F43.10: ICD-10-CM

## 2018-07-26 DIAGNOSIS — K21.9: ICD-10-CM

## 2018-07-26 DIAGNOSIS — R07.89: Primary | ICD-10-CM

## 2018-07-26 DIAGNOSIS — J44.9: ICD-10-CM

## 2018-07-26 DIAGNOSIS — Z87.440: ICD-10-CM

## 2018-07-26 LAB
ALBUMIN SERPL-MCNC: 4.4 G/DL (ref 3.5–5)
ALBUMIN/GLOB SERPL: 1.7 {RATIO} (ref 1–2.1)
ALT SERPL-CCNC: 25 U/L (ref 9–52)
APTT BLD: 31 SECONDS (ref 21–34)
AST SERPL-CCNC: 18 U/L (ref 14–36)
BACTERIA #/AREA URNS HPF: (no result) /[HPF]
BASOPHILS # BLD AUTO: 0 K/UL (ref 0–0.2)
BASOPHILS NFR BLD: 0.3 % (ref 0–2)
BILIRUB UR-MCNC: NEGATIVE MG/DL
BUN SERPL-MCNC: 16 MG/DL (ref 7–17)
CALCIUM SERPL-MCNC: 9.5 MG/DL (ref 8.6–10.4)
CAOX CRY #/AREA URNS HPF: (no result) /HPF
EOSINOPHIL # BLD AUTO: 0.1 K/UL (ref 0–0.7)
EOSINOPHIL NFR BLD: 0.9 % (ref 0–4)
ERYTHROCYTE [DISTWIDTH] IN BLOOD BY AUTOMATED COUNT: 13.3 % (ref 11.5–14.5)
GFR NON-AFRICAN AMERICAN: > 60
GLUCOSE UR STRIP-MCNC: NORMAL MG/DL
HCG,QUALITATIVE URINE: NEGATIVE
HGB BLD-MCNC: 13 G/DL (ref 11–16)
INR PPP: 1.1
LEUKOCYTE ESTERASE UR-ACNC: (no result) LEU/UL
LYMPHOCYTES # BLD AUTO: 3.2 K/UL (ref 1–4.3)
LYMPHOCYTES NFR BLD AUTO: 28.4 % (ref 20–40)
MCH RBC QN AUTO: 30.1 PG (ref 27–31)
MCHC RBC AUTO-ENTMCNC: 33.9 G/DL (ref 33–37)
MCV RBC AUTO: 88.7 FL (ref 81–99)
MONOCYTES # BLD: 0.8 K/UL (ref 0–0.8)
MONOCYTES NFR BLD: 7.3 % (ref 0–10)
NEUTROPHILS # BLD: 7.1 K/UL (ref 1.8–7)
NEUTROPHILS NFR BLD AUTO: 63.1 % (ref 50–75)
NRBC BLD AUTO-RTO: 0 % (ref 0–2)
PH UR STRIP: 5 [PH] (ref 5–8)
PLATELET # BLD: 292 K/UL (ref 130–400)
PMV BLD AUTO: 9.7 FL (ref 7.2–11.7)
PROT UR STRIP-MCNC: NEGATIVE MG/DL
PROTHROMBIN TIME: 11.8 SECONDS (ref 9.7–12.2)
RBC # BLD AUTO: 4.32 MIL/UL (ref 3.8–5.2)
RBC # UR STRIP: NEGATIVE /UL
SP GR UR STRIP: 1.03 (ref 1–1.03)
SQUAMOUS EPITHIAL: 26 /HPF (ref 0–5)
UROBILINOGEN UR-MCNC: NORMAL MG/DL (ref 0.2–1)
WBC # BLD AUTO: 11.3 K/UL (ref 4.8–10.8)

## 2018-07-26 PROCEDURE — 80061 LIPID PANEL: CPT

## 2018-07-26 PROCEDURE — 85378 FIBRIN DEGRADE SEMIQUANT: CPT

## 2018-07-26 PROCEDURE — 80346 BENZODIAZEPINES1-12: CPT

## 2018-07-26 PROCEDURE — 93005 ELECTROCARDIOGRAM TRACING: CPT

## 2018-07-26 PROCEDURE — 80324 DRUG SCREEN AMPHETAMINES 1/2: CPT

## 2018-07-26 PROCEDURE — 84484 ASSAY OF TROPONIN QUANT: CPT

## 2018-07-26 PROCEDURE — 84703 CHORIONIC GONADOTROPIN ASSAY: CPT

## 2018-07-26 PROCEDURE — 93970 EXTREMITY STUDY: CPT

## 2018-07-26 PROCEDURE — 72040 X-RAY EXAM NECK SPINE 2-3 VW: CPT

## 2018-07-26 PROCEDURE — 73721 MRI JNT OF LWR EXTRE W/O DYE: CPT

## 2018-07-26 PROCEDURE — 84443 ASSAY THYROID STIM HORMONE: CPT

## 2018-07-26 PROCEDURE — 80358 DRUG SCREENING METHADONE: CPT

## 2018-07-26 PROCEDURE — 85730 THROMBOPLASTIN TIME PARTIAL: CPT

## 2018-07-26 PROCEDURE — 36415 COLL VENOUS BLD VENIPUNCTURE: CPT

## 2018-07-26 PROCEDURE — 96374 THER/PROPH/DIAG INJ IV PUSH: CPT

## 2018-07-26 PROCEDURE — 80349 CANNABINOIDS NATURAL: CPT

## 2018-07-26 PROCEDURE — 80361 OPIATES 1 OR MORE: CPT

## 2018-07-26 PROCEDURE — 81001 URINALYSIS AUTO W/SCOPE: CPT

## 2018-07-26 PROCEDURE — 85025 COMPLETE CBC W/AUTO DIFF WBC: CPT

## 2018-07-26 PROCEDURE — 80053 COMPREHEN METABOLIC PANEL: CPT

## 2018-07-26 PROCEDURE — 93306 TTE W/DOPPLER COMPLETE: CPT

## 2018-07-26 PROCEDURE — 83992 ASSAY FOR PHENCYCLIDINE: CPT

## 2018-07-26 PROCEDURE — 85610 PROTHROMBIN TIME: CPT

## 2018-07-26 PROCEDURE — 71046 X-RAY EXAM CHEST 2 VIEWS: CPT

## 2018-07-26 PROCEDURE — 80345 DRUG SCREENING BARBITURATES: CPT

## 2018-07-26 PROCEDURE — 80353 DRUG SCREENING COCAINE: CPT

## 2018-07-26 PROCEDURE — 99284 EMERGENCY DEPT VISIT MOD MDM: CPT

## 2018-07-26 NOTE — C.PDOC
History Of Present Illness


48 -year-old female with PMHx of COPD and depression presents to the ED with 

chest tightness since yesterday. Patient also reports feeling stressed out.  

Patient states she received an injection in her right knee on 7/25/18. She also 

complains of paresthesias going down to her hands.





Time Seen by Provider: 07/26/18 16:30


Chief Complaint (Nursing): Chest Pain


History Per: Patient


History/Exam Limitations: no limitations


Onset/Duration Of Symptoms: Days


Current Symptoms Are (Timing): Still Present (intermittent)


Alleviating Factors: None


Recent travel outside of the [place]: No





Past Medical History


Vital Signs: 


 Last Vital Signs











Temp  98 F   07/26/18 16:21


 


Pulse  69   07/26/18 18:35


 


Resp  12   07/26/18 18:35


 


BP  118/80   07/26/18 18:35


 


Pulse Ox  98   07/26/18 18:35














- Medical History


PMH: Anxiety, Arthritis, COPD, Post Traumatic Stress Disorder, Pulmonary 

Embolism


Other PMH: Endometriosis


Other Surgeries: Endometriosis removed


Family History: States: Unknown Family Hx





- Social History


Hx Tobacco Use: No


Hx Alcohol Use: No (''Occassionally'')


Hx Substance Use: No (''Past abuse of Xanax'')





- Immunization History


Hx Tetanus Toxoid Vaccination: No


Hx Influenza Vaccination: No


Hx Pneumococcal Vaccination: No





Review Of Systems


Except As Marked, All Systems Reviewed And Found Negative.


Constitutional: Negative for: Fever


Cardiovascular: Positive for: Chest Pain


Respiratory: Negative for: Cough, Shortness of Breath


Gastrointestinal: Negative for: Nausea, Vomiting


Neurological: Positive for: Other (paresthesia).  Negative for: Weakness, 

Headache, Dizziness





Physical Exam





- Physical Exam


Appears: Well, Non-toxic, No Acute Distress


Skin: Normal Color, Warm, Dry


Head: Atraumatic, Normacephalic


Eye(s): bilateral: PERRL, EOMI


Nose: Normal


Oral Mucosa: Moist


Throat: Normal


Neck: Normal, Normal ROM, Supple


Chest: Symmetrical, No Tenderness


Cardiovascular: Rhythm Regular


Respiratory: Normal Breath Sounds, No Rales, No Rhonchi, No Wheezing


Gastrointestinal/Abdominal: Normal Exam, Soft, No Tenderness, No Guarding


Extremity: Bilateral: Normal Color And Temperature, Normal ROM


Neurological/Psych: Oriented x3, Normal Speech





ED Course And Treatment





- Laboratory Results


Result Diagrams: 


 07/26/18 17:17





 07/26/18 17:17


O2 Sat by Pulse Oximetry: 97 (RA)


Pulse Ox Interpretation: Normal





Medical Decision Making


Medical Decision Making: 


Impression: 48 -year-old female with chest tightness -atypical pain





Plan:


--EKG


--CMP


-- Troponin I


--CBC


--Prothrombin Time


--Partial Thromboplastin Time


--Chest X-Ray


--UA


--Urine HCG


- Ativan 0.5 mg IV





case discussed with dr galo, accepts for obs. asa given. pt reports extensive 

family hx, uncomfortable going home. 





Disposition





- Disposition


Disposition: HOME/ ROUTINE


Disposition Time: 06:00


Condition: STABLE





- Clinical Impression


Clinical Impression: 


 Chest pain








- Scribe Statement


The provider has reviewed the documentation as recorded by the Scribe (Carol Smart)


Provider Attestation: 





All medical record entries made by the Scribe were at my direction and 

personally dictated by me. I have reviewed the chart and agree that the record 

accurately reflects my personal performance of the history, physical exam, 

medical decision making, and the department course for this patient. I have 

also personally directed, reviewed, and agree with the discharge instructions 

and disposition.





Decision To Admit





- Pt Status Changed To:


Hospital Disposition Of: Observation





- .


Bed Request Type: Telemetry


Admitting Physician: Kevin Galo


Patient Diagnosis: 


 Chest pain

## 2018-07-27 LAB
ALBUMIN SERPL-MCNC: 4.1 G/DL (ref 3.5–5)
ALBUMIN/GLOB SERPL: 1.6 {RATIO} (ref 1–2.1)
ALT SERPL-CCNC: 31 U/L (ref 9–52)
AST SERPL-CCNC: 16 U/L (ref 14–36)
BASOPHILS # BLD AUTO: 0 K/UL (ref 0–0.2)
BASOPHILS NFR BLD: 0.3 % (ref 0–2)
BUN SERPL-MCNC: 14 MG/DL (ref 7–17)
CALCIUM SERPL-MCNC: 9 MG/DL (ref 8.6–10.4)
CK MB SERPL-MCNC: 0.36 NG/ML (ref 0–3.38)
CK MB SERPL-MCNC: < 0.22 NG/ML (ref 0–3.38)
EOSINOPHIL # BLD AUTO: 0.1 K/UL (ref 0–0.7)
EOSINOPHIL NFR BLD: 1.9 % (ref 0–4)
ERYTHROCYTE [DISTWIDTH] IN BLOOD BY AUTOMATED COUNT: 13.7 % (ref 11.5–14.5)
GFR NON-AFRICAN AMERICAN: > 60
HDLC SERPL-MCNC: 39 MG/DL (ref 30–70)
HGB BLD-MCNC: 13.4 G/DL (ref 11–16)
LDLC SERPL-MCNC: 100 MG/DL (ref 0–129)
LYMPHOCYTES # BLD AUTO: 2.1 K/UL (ref 1–4.3)
LYMPHOCYTES NFR BLD AUTO: 31.1 % (ref 20–40)
MCH RBC QN AUTO: 30.3 PG (ref 27–31)
MCHC RBC AUTO-ENTMCNC: 34 G/DL (ref 33–37)
MCV RBC AUTO: 89.1 FL (ref 81–99)
MONOCYTES # BLD: 0.5 K/UL (ref 0–0.8)
MONOCYTES NFR BLD: 6.8 % (ref 0–10)
NEUTROPHILS # BLD: 4 K/UL (ref 1.8–7)
NEUTROPHILS NFR BLD AUTO: 59.9 % (ref 50–75)
NRBC BLD AUTO-RTO: 0 % (ref 0–2)
PLATELET # BLD: 274 K/UL (ref 130–400)
PMV BLD AUTO: 10 FL (ref 7.2–11.7)
RBC # BLD AUTO: 4.41 MIL/UL (ref 3.8–5.2)
WBC # BLD AUTO: 6.6 K/UL (ref 4.8–10.8)

## 2018-07-27 RX ADMIN — ENOXAPARIN SODIUM SCH MG: 40 INJECTION SUBCUTANEOUS at 09:42

## 2018-07-27 RX ADMIN — PANTOPRAZOLE SODIUM SCH MG: 40 TABLET, DELAYED RELEASE ORAL at 09:43

## 2018-07-27 NOTE — CP.PCM.PN
Subjective





- Date & Time of Evaluation


Date of Evaluation: 07/27/18


Time of Evaluation: 09:03





- Subjective


Subjective: 





Progress Note Dr. BELÉN Sloan








Patient seen and examined at bedside. Per nursing no acute events occurred 

overnight. Patient still does reports some chest tightness that is located mid-

sternally. The patient does state she has alot of stress in her life currently.

  She reports having to work longer hours at her job due to a loss in workers 

and her son not wanting to see her any longer.  She reports crying daily for 

the past couple of months.  She denies any dizziness, changes in vision, change











______________





48 year old female with a past medical history of depression who came to the 

emergency room after reporting chest tightness.  The patient also reported some 

numbness and tingling running down her upper extremities.





Past medical history: depression


Surgical history:


Allergies: codeine, doxycycline, morphine, Penicillin, tetracycline


Social history:





Objective





- Vital Signs/Intake and Output


Vital Signs (last 24 hours): 


 











Temp Pulse Resp BP Pulse Ox


 


 97.4 F L  69   20   106/72   97 


 


 07/27/18 08:46  07/27/18 08:46  07/27/18 08:46  07/27/18 08:46  07/27/18 08:46











- Medications


Medications: 


 Current Medications





Aspirin (Aspirin)  325 mg PO DAILY SUSAN


Enoxaparin Sodium (Lovenox)  40 mg SC DAILY SUSAN


Pantoprazole Sodium (Protonix Ec Tab)  40 mg PO DAILY SUSAN


Paroxetine HCl (Paxil)  40 mg PO DAILY SUSAN


Trazodone HCl (Desyrel)  200 mg PO DAILY [facility]











- Labs


Labs: 


 





 07/26/18 17:17 





 07/26/18 17:17 





 











PT  11.8 SECONDS (9.7-12.2)   07/26/18  17:17    


 


INR  1.1   07/26/18  17:17    


 


APTT  31 SECONDS (21-34)   07/26/18  17:17    














Assessment and Plan





- Assessment and Plan (Free Text)


Assessment: 





48 year old female with a past medical history of COPD and depression who came 

to the emergency room after reporting chest tightness.


Plan: 





1.Chest pain r/o acs


-EKG: Normal sinus rhythm @70bpm


-Troponins (-)x3


-Cardiology (Dr. Pa) consuled. Help appreciated.


-Lipid panel ordered. Will f/u with results.


-TSH ordered. Will f/u with results.


-Aspirin 325mg PO Daily





2. Insomnia


-Trazdone 200 mg PO HS





3. Anxiety


-Xanax 1.5mg PO HS





4. Depression


-Patient reports having multiple stressors in her life including her son not 

wanting to see her and increased pressure at work.


-Paxil 40mg PO Daily





PPX


Lovenox 40mg SC Daily


Protonix 40mg IVP Daily


Heart Healthy diet





Dispo: Awaiting Cardiology recommendations. 





Plan and management discussed with Dr. BELÉN Sloan.





Scott Caldera, PGY-2

## 2018-07-27 NOTE — CON
DATE:  07/27/2018



REASON FOR CONSULTATION:  Chest pain.



HISTORY OF PRESENT ILLNESS:  The patient is a 48-year-old female who has

history of posttraumatic stress syndrome, on Xanax and Paxil at home.  She

presented because of right-sided chest pain that is sharp, associated with

right arm numbness.  The patient denies any associated diaphoresis and is

unaware of any prior cardiac history.



SOCIAL HISTORY:  The patient is a nonsmoker; however, she has COPD because

of second-hand smoking.



CURRENT MEDICATIONS:  Aspirin 325 mg once a day, trazodone 200 mg at

bedtime, Lovenox 40 mg subcutaneous once a day, Paxil 40 mg once a day,

Protonix 40 mg once a day, Xanax 1.5 mg at bedtime.



REVIEW OF SYSTEMS:  No nausea or vomiting.  No abdominal pain.  No fever or

chills.



PHYSICAL EXAMINATION:

GENERAL:  The patient is a middle-aged female who does not appear to be in

any distress.

VITAL SIGNS:  Blood pressure _____, heart rate 69, temperature 97.4,

respiration 20.

HEENT:  Normocephalic.

CHEST:  Clear.

HEART:  S1 and S2 are regular.

ABDOMEN:  Soft.

EXTREMITIES:  No edema and no calf tenderness.



LABORATORY DATA:  Today's SMA-7 is within normal limits except for glucose

of 144.  Three sets of troponins are negative.  Lipid profile is within

normal limit.  TSH level is within normal limit.  Today's CBC is entirely

within normal limit.  PT, PTT and INR are within normal limit.  EKG

revealed normal sinus rhythm at a rate of 69.



ASSESSMENT:

1.  Chest pain.  Myocardial infarction was ruled out.

2.  Rule out pulmonary infarction.

3.  Posttraumatic stress syndrome.



RECOMMENDATIONS:  Continue current aspirin, subcutaneous Lovenox and

anti-depressants.  Obtain stat D-dimer and urine for drug screen.  Obtain

venous Doppler of the lower extremities as well as echocardiographic study.







__________________________________________

Avila Pa MD



DD:  07/27/2018 15:20:43

DT:  07/27/2018 16:40:53

Job # [id number]

## 2018-07-27 NOTE — CARD
--------------- APPROVED REPORT --------------





Date of service: 07/26/2018



EKG Measurement

Heart [id number]

UT [id number]

[id number]

[id number]

[id number]



<Conclusion>

Normal sinus rhythm

Normal ECG

## 2018-07-27 NOTE — RAD
Date of service: 



07/27/2018



PROCEDURE:  Cervical Spine Radiographs.



HISTORY:

Pain. 



COMPARISON:

None. 



FINDINGS:



BONES:

Straightening of the normal lordosis. No fracture.  Dens Intact. 



DISC SPACES:

Normal. 



SOFT TISSUES:

Normal. No prevertebral soft tissue swelling. 



OTHER FINDINGS:

None.



IMPRESSION:

Straightening of the normal lordosis may be secondary to positioning/ 

spasm.  No acute fracture

## 2018-07-28 LAB
ALBUMIN SERPL-MCNC: 4.2 G/DL (ref 3.5–5)
ALBUMIN/GLOB SERPL: 1.6 {RATIO} (ref 1–2.1)
ALT SERPL-CCNC: 32 U/L (ref 9–52)
AST SERPL-CCNC: 18 U/L (ref 14–36)
BASOPHILS # BLD AUTO: 0 K/UL (ref 0–0.2)
BASOPHILS NFR BLD: 0.1 % (ref 0–2)
BUN SERPL-MCNC: 12 MG/DL (ref 7–17)
CALCIUM SERPL-MCNC: 9.1 MG/DL (ref 8.6–10.4)
EOSINOPHIL # BLD AUTO: 0.1 K/UL (ref 0–0.7)
EOSINOPHIL NFR BLD: 1.1 % (ref 0–4)
ERYTHROCYTE [DISTWIDTH] IN BLOOD BY AUTOMATED COUNT: 13.2 % (ref 11.5–14.5)
GFR NON-AFRICAN AMERICAN: > 60
HGB BLD-MCNC: 13.5 G/DL (ref 11–16)
LYMPHOCYTES # BLD AUTO: 1.9 K/UL (ref 1–4.3)
LYMPHOCYTES NFR BLD AUTO: 25.6 % (ref 20–40)
MCH RBC QN AUTO: 29.9 PG (ref 27–31)
MCHC RBC AUTO-ENTMCNC: 33.6 G/DL (ref 33–37)
MCV RBC AUTO: 89 FL (ref 81–99)
MONOCYTES # BLD: 0.5 K/UL (ref 0–0.8)
MONOCYTES NFR BLD: 6.6 % (ref 0–10)
NEUTROPHILS # BLD: 4.9 K/UL (ref 1.8–7)
NEUTROPHILS NFR BLD AUTO: 66.6 % (ref 50–75)
NRBC BLD AUTO-RTO: 0 % (ref 0–2)
PLATELET # BLD: 273 K/UL (ref 130–400)
PMV BLD AUTO: 9.9 FL (ref 7.2–11.7)
RBC # BLD AUTO: 4.52 MIL/UL (ref 3.8–5.2)
WBC # BLD AUTO: 7.3 K/UL (ref 4.8–10.8)

## 2018-07-28 RX ADMIN — PANTOPRAZOLE SODIUM SCH MG: 40 TABLET, DELAYED RELEASE ORAL at 10:22

## 2018-07-28 RX ADMIN — ENOXAPARIN SODIUM SCH MG: 40 INJECTION SUBCUTANEOUS at 10:22

## 2018-07-28 NOTE — PN
DATE:  07/28/2018



SUBJECTIVE:  The patient is still experiencing right-sided sharp chest

pain, which is steady.  No shortness of breath.



PHYSICAL EXAMINATION:

VITAL SIGNS:  Blood pressure _____, heart rate 62, temperature 98.1, and

respirations 20.

HEENT:  Normocephalic.

CHEST:  Clear.

HEART:  S1 and S2 regular.

EXTREMITIES:  No edema or calf tenderness.



LABORATORY DATA:  Today's CBC is entirely within normal limit.  Today's

SMA-7 is entirely within normal limit.  Urine drug screen is positive for

cannabinoids and benzodiazepines.  D-dimer is within normal limit.  Venous

Doppler of lower extremity, preliminary report, no evidence of DVT. 

Cervical spine CT scan, straightening of the normal lordosis, may be

secondary to positioning/spasm.  No acute fracture.  Echocardiographic

study, normal left ventricular size, wall thickness, systolic and diastolic

function.



ASSESSMENT:

1.  Atypical chest discomfort.

2.  _____ stress syndrome.



RECOMMENDATIONS:  Continue current aspirin, Desyrel, subcutaneous Lovenox,

and Xanax.  Consider an outpatient treadmill stress testing.







__________________________________________

Avila Pa MD



DD:  07/28/2018 14:09:30

DT:  07/28/2018 14:41:56

Job # [id number]

## 2018-07-28 NOTE — CP.PCM.PN
Subjective





- Date & Time of Evaluation


Date of Evaluation: 07/28/18


Time of Evaluation: 08:40





- Subjective


Subjective: 


clinically same





Objective





- Vital Signs/Intake and Output


Vital Signs (last 24 hours): 


 











Temp Pulse Resp BP Pulse Ox


 


 98.5 F   64   20   108/73   96 


 


 07/28/18 15:00  07/28/18 15:59  07/28/18 15:00  07/28/18 15:00  07/28/18 15:00











- Medications


Medications: 


 Current Medications





Acetaminophen (Tylenol 325mg Tab)  650 mg PO Q6 PRN


   PRN Reason: Pain, moderate (4-7)


   Last Admin: 07/28/18 10:21 Dose:  650 mg


Alprazolam (Xanax)  1.5 mg PO HS [facility]


   Last Admin: 07/27/18 21:55 Dose:  1.5 mg


Aspirin (Aspirin)  325 mg PO DAILY [facility]


   Last Admin: 07/28/18 10:21 Dose:  325 mg


Enoxaparin Sodium (Lovenox)  40 mg SC DAILY [facility]


   Last Admin: 07/28/18 10:22 Dose:  40 mg


Pantoprazole Sodium (Protonix Ec Tab)  40 mg PO DAILY [facility]


   Last Admin: 07/28/18 10:22 Dose:  40 mg


Paroxetine HCl (Paxil)  40 mg PO DAILY [facility]


   Last Admin: 07/28/18 10:22 Dose:  40 mg


Trazodone HCl (Desyrel)  200 mg PO [facility]


   Last Admin: 07/27/18 21:55 Dose:  200 mg











- Labs


Labs: 


 





 07/28/18 10:52 





 07/28/18 10:52 





 











PT  11.8 SECONDS (9.7-12.2)   07/26/18  17:17    


 


INR  1.1   07/26/18  17:17    


 


APTT  31 SECONDS (21-34)   07/26/18  17:17    














- Constitutional


Appears: Well





- Head Exam


Head Exam: ATRAUMATIC, NORMAL INSPECTION, NORMOCEPHALIC





- Eye Exam


Eye Exam: EOMI, Normal appearance, PERRL


Pupil Exam: NORMAL ACCOMODATION, PERRL





- ENT Exam


ENT Exam: Mucous Membranes Moist, Normal Exam





- Neck Exam


Neck Exam: Full ROM, Normal Inspection.  absent: Lymphadenopathy





- Respiratory Exam


Respiratory Exam: Decreased Breath Sounds





- Cardiovascular Exam


Cardiovascular Exam: REGULAR RHYTHM, +S1, +S2





- GI/Abdominal Exam


GI & Abdominal Exam: Soft, Diminished Bowel Sounds





- Rectal Exam


Rectal Exam: Deferred

## 2018-07-28 NOTE — CARD
--------------- APPROVED REPORT --------------





Date of service: 07/28/2018



EXAM: Two-dimensional and M-mode echocardiogram with Doppler and 

color Doppler.



Other Information 

Quality : GoodRhythm : NSR



INDICATION

Chest Pain 



M-Mode DIMENSIONS 

RVDd1.70   (2.1-3.2cm)Left Atrium (MM)3.54   (2.5-4.0cm)

IVSd0.96   (0.7-1.1cm)Aortic Root2.69   (2.2-3.7cm)

LVDd5.27   (4.0-5.6cm)Aortic Cusp Exc.1.99   (1.5-2.0cm)

PWd1.03   (0.7-1.1cm)FS (%) 40   %

LVDs3.17   (2.0-3.8cm)LVEF (%)70   (>50%)



Aortic Valve

AoV Peak Wozvwzuz013.1cm/Nahid Peak GR.6mmHg



Mitral Valve

MV E Sxnpeydk97.1cm/sMV A Nnkjrjli64.0cm/sE/A ratio1.5



TDI

E/Lateral E'0.0E/Medial E'0.0



Tricuspid Valve

TR Peak Sgevdmbj691fd/sTR Peak Gr.97xnMrWNDL93afUi



<Conclusion>

normal size la,lv & ra rv.

normal lv wall motion,thickness,systolic & diastolic function with 

lvef of 65-70%.

normal aortic,mitral,tv & pv.

mild pi,tr & trace mr with normal pulmonary systolic pressures of 30 

mm of hg.

normal ivc & aortic root.

no pericardial effusiuon seen.

## 2018-07-29 LAB
ALBUMIN SERPL-MCNC: 3.9 G/DL (ref 3.5–5)
ALBUMIN/GLOB SERPL: 1.5 {RATIO} (ref 1–2.1)
ALT SERPL-CCNC: 23 U/L (ref 9–52)
AST SERPL-CCNC: 21 U/L (ref 14–36)
BASOPHILS # BLD AUTO: 0 K/UL (ref 0–0.2)
BASOPHILS NFR BLD: 0.5 % (ref 0–2)
BUN SERPL-MCNC: 15 MG/DL (ref 7–17)
CALCIUM SERPL-MCNC: 8.9 MG/DL (ref 8.6–10.4)
EOSINOPHIL # BLD AUTO: 0.1 K/UL (ref 0–0.7)
EOSINOPHIL NFR BLD: 1.8 % (ref 0–4)
ERYTHROCYTE [DISTWIDTH] IN BLOOD BY AUTOMATED COUNT: 13.3 % (ref 11.5–14.5)
GFR NON-AFRICAN AMERICAN: > 60
HGB BLD-MCNC: 13.4 G/DL (ref 11–16)
LYMPHOCYTES # BLD AUTO: 2 K/UL (ref 1–4.3)
LYMPHOCYTES NFR BLD AUTO: 28.3 % (ref 20–40)
MCH RBC QN AUTO: 30.1 PG (ref 27–31)
MCHC RBC AUTO-ENTMCNC: 33.9 G/DL (ref 33–37)
MCV RBC AUTO: 89 FL (ref 81–99)
MONOCYTES # BLD: 0.4 K/UL (ref 0–0.8)
MONOCYTES NFR BLD: 6.3 % (ref 0–10)
NEUTROPHILS # BLD: 4.4 K/UL (ref 1.8–7)
NEUTROPHILS NFR BLD AUTO: 63.1 % (ref 50–75)
NRBC BLD AUTO-RTO: 0 % (ref 0–2)
PLATELET # BLD: 271 K/UL (ref 130–400)
PMV BLD AUTO: 9.6 FL (ref 7.2–11.7)
RBC # BLD AUTO: 4.44 MIL/UL (ref 3.8–5.2)
WBC # BLD AUTO: 7 K/UL (ref 4.8–10.8)

## 2018-07-29 RX ADMIN — PANTOPRAZOLE SODIUM SCH MG: 40 TABLET, DELAYED RELEASE ORAL at 09:30

## 2018-07-29 RX ADMIN — ENOXAPARIN SODIUM SCH MG: 40 INJECTION SUBCUTANEOUS at 09:31

## 2018-07-29 NOTE — PN
DATE:  07/29/2018



SUBJECTIVE:  The patient denies any chest pain or shortness of breath.



PHYSICAL EXAMINATION:

VITAL SIGNS:  Blood pressure 96/63, heart rate 67, temperature 97.9, and

respirations 20.

HEENT:  Head normocephalic.

CHEST:  Clear.

HEART:  S1 and S2 regular.

EXTREMITIES:  No edema.



LABORATORY DATA:  Today's SMA-7 is entirely within normal limits.  Today's

CBC is entirely within normal limits.



ASSESSMENT:  Atypical chest pain, myocardial infarction is ruled out.



RECOMMENDATIONS:  Continue current aspirin and subcutaneous Lovenox _____. 

Consider an outpatient Lexiscan stress test.





__________________________________________

Avila Pa MD



DD:  07/29/2018 13:24:48

DT:  07/29/2018 14:07:01

Job # [id number]

## 2018-07-29 NOTE — CP.PCM.PN
Subjective





- Date & Time of Evaluation


Date of Evaluation: 07/29/18


Time of Evaluation: 09:40





- Subjective


Subjective: 


clinically same





Objective





- Vital Signs/Intake and Output


Vital Signs (last 24 hours): 


 











Temp Pulse Resp BP Pulse Ox


 


 97.9 F   67   20   96/63 L  96 


 


 07/29/18 07:00  07/29/18 07:00  07/29/18 07:00  07/29/18 07:00  07/29/18 07:00











- Medications


Medications: 


 Current Medications





Acetaminophen (Tylenol 325mg Tab)  650 mg PO Q6 PRN


   PRN Reason: Pain, moderate (4-7)


   Last Admin: 07/28/18 10:21 Dose:  650 mg


Alprazolam (Xanax)  1.5 mg PO [facility]


   Last Admin: 07/28/18 21:01 Dose:  1.5 mg


Aspirin (Aspirin)  325 mg PO DAILY [facility]


   Last Admin: 07/29/18 09:31 Dose:  325 mg


Enoxaparin Sodium (Lovenox)  40 mg SC DAILY [facility]


   Last Admin: 07/29/18 09:31 Dose:  40 mg


Pantoprazole Sodium (Protonix Ec Tab)  40 mg PO DAILY [facility]


   Last Admin: 07/29/18 09:30 Dose:  40 mg


Paroxetine HCl (Paxil)  40 mg PO DAILY [facility]


   Last Admin: 07/29/18 09:30 Dose:  40 mg


Trazodone HCl (Desyrel)  200 mg PO HS [facility]


   Last Admin: 07/28/18 21:01 Dose:  200 mg











- Labs


Labs: 


 





 07/29/18 10:54 





 07/29/18 10:54 





 











PT  11.8 SECONDS (9.7-12.2)   07/26/18  17:17    


 


INR  1.1   07/26/18  17:17    


 


APTT  31 SECONDS (21-34)   07/26/18  17:17    














- Constitutional


Appears: Well





- Head Exam


Head Exam: ATRAUMATIC, NORMAL INSPECTION, NORMOCEPHALIC





- Eye Exam


Eye Exam: EOMI, Normal appearance, PERRL


Pupil Exam: NORMAL ACCOMODATION, PERRL





- ENT Exam


ENT Exam: Mucous Membranes Moist, Normal Exam





- Neck Exam


Neck Exam: Full ROM, Normal Inspection.  absent: Lymphadenopathy





- Respiratory Exam


Respiratory Exam: Decreased Breath Sounds





- Cardiovascular Exam


Cardiovascular Exam: REGULAR RHYTHM, +S1, +S2





- GI/Abdominal Exam


GI & Abdominal Exam: Soft, Diminished Bowel Sounds





- Rectal Exam


Rectal Exam: Deferred

## 2018-07-30 VITALS
DIASTOLIC BLOOD PRESSURE: 64 MMHG | SYSTOLIC BLOOD PRESSURE: 95 MMHG | TEMPERATURE: 98 F | OXYGEN SATURATION: 97 % | RESPIRATION RATE: 20 BRPM

## 2018-07-30 VITALS — HEART RATE: 63 BPM

## 2018-07-30 RX ADMIN — PANTOPRAZOLE SODIUM SCH MG: 40 TABLET, DELAYED RELEASE ORAL at 10:44

## 2018-07-30 RX ADMIN — ENOXAPARIN SODIUM SCH MG: 40 INJECTION SUBCUTANEOUS at 10:44

## 2018-07-30 NOTE — CP.PCM.PN
Subjective





- Date & Time of Evaluation


Date of Evaluation: 07/30/18


Time of Evaluation: 10:51





- Subjective


Subjective: 





PGY-2 Progress Note for Dr. BELÉN Sloan's Service





Patient seen and examined at bedside. Per nursing no acute events occurred 

overnight. Patient tolerating diet with no complaints and passing bowels. 

Patient does admit to right knee pain after ambulation for long periods of 

time. Patient denies any chest pain, fevers, chills, nausea, vomiting, syncopal 

episodes, headaches, dizziness, or any other complaints.





Objective





- Vital Signs/Intake and Output


Vital Signs (last 24 hours): 


 











Temp Pulse Resp BP Pulse Ox


 


 98.0 F   55 L  20   95/64 L  97 


 


 07/30/18 07:00  07/30/18 07:54  07/30/18 07:00  07/30/18 07:00  07/30/18 07:52








Intake and Output: 


 











 07/30/18 07/30/18





 06:59 18:59


 


Intake Total 500 


 


Balance 500 














- Medications


Medications: 


 Current Medications





Acetaminophen (Tylenol 325mg Tab)  650 mg PO Q6 PRN


   PRN Reason: Pain, moderate (4-7)


   Last Admin: 07/29/18 21:48 Dose:  650 mg


Alprazolam (Xanax)  1.5 mg PO HS [facility]


   Last Admin: 07/29/18 21:49 Dose:  1.5 mg


Aspirin (Aspirin)  325 mg PO DAILY [facility]


   Last Admin: 07/30/18 10:44 Dose:  325 mg


Enoxaparin Sodium (Lovenox)  40 mg SC DAILY [facility]


   Last Admin: 07/30/18 10:44 Dose:  40 mg


Pantoprazole Sodium (Protonix Ec Tab)  40 mg PO DAILY [facility]


   Last Admin: 07/30/18 10:44 Dose:  40 mg


Paroxetine HCl (Paxil)  40 mg PO DAILY [facility]


   Last Admin: 07/30/18 10:44 Dose:  40 mg


Trazodone HCl (Desyrel)  200 mg PO HS [facility]


   Last Admin: 07/29/18 21:48 Dose:  200 mg











- Labs


Labs: 


 





 07/29/18 10:54 





 07/29/18 10:54 





 











PT  11.8 SECONDS (9.7-12.2)   07/26/18  17:17    


 


INR  1.1   07/26/18  17:17    


 


APTT  31 SECONDS (21-34)   07/26/18  17:17    














- Head Exam


Head Exam: ATRAUMATIC, NORMAL INSPECTION, NORMOCEPHALIC





- ENT Exam


ENT Exam: Mucous Membranes Moist, Normal Exam, Normal Oropharynx





- Neck Exam


Neck Exam: Normal Inspection.  absent: Lymphadenopathy, Thyromegaly





- Respiratory Exam


Respiratory Exam: Clear to Ausculation Bilateral, NORMAL BREATHING PATTERN





- Cardiovascular Exam


Cardiovascular Exam: REGULAR RHYTHM, +S1, +S2





- GI/Abdominal Exam


GI & Abdominal Exam: Soft, Normal Bowel Sounds.  absent: Hyperactive Bowel 

Sounds





- Neurological Exam


Neurological Exam: Alert, Awake, Oriented x3





- Psychiatric Exam


Psychiatric exam: Normal Affect, Normal Mood





- Skin


Skin Exam: Dry, Intact, Normal Color.  absent: Diaphoretic, Pallor, Pallor





Assessment and Plan





- Assessment and Plan (Free Text)


Assessment: 








48 year old female with a past medical history of COPD and depression who came 

to the emergency room after reporting chest tightness.


Plan: 





1.Chest pain r/o acs


-EKG: Normal sinus rhythm @70bpm


-Troponins (-)x3


-Cardiology (Dr. Pa) consulted


   -Per Cardiology: continue Aspirin and subcutaneous Lovenox, Outpatient 

Lexiscan


-Lipid panel 


   :Triglycerides-124


   :Cholesterol- 171


   :LDL-100


   :HDL-39


-TSH 1.03


-Aspirin 325mg PO Daily





2. Insomnia


-Trazdone 200 mg PO HS





3. Anxiety


-Xanax 1.5mg PO HS





4. Depression


-Patient reports having multiple stressors in her life including her son not 

wanting to see her and increased pressure at work.


-Paxil 40mg PO Daily





PPX


Lovenox 40mg SC Daily


Protonix 40mg IVP Daily


Heart Healthy diet





Dispo: Patient cleared for discharge home.





Plan and management discussed with Dr. BELÉN Sloan.





Scott Caldera, PGY-2














Discharge Instructions:


1. Patient should follow up with Dr. BELÉN Sloan in his office(Appointment 8/1/18)

  within 5 to 7 days upon discharge.


2. Advised patient to follow up with Dr. Pa for Outpatient Lexiscan.


3. Advised patient to return to hospital for any new or worsening symptoms.


4.Patient provided work note upon discharge.





Medicatons:


1. Aspirin 81mg PO Daily, #30, No refills.

## 2018-07-30 NOTE — MRI
Date of service: 



07/30/2018



PROCEDURE:  MRI Right Knee



HISTORY:

Right knee pain and swelling 



COMPARISON:

None available. 



TECHNIQUE:

Multiecho multiplanar sequences were performed through the right knee.



FINDINGS:



ANTERIOR CRUCIATE LIGAMENT::

There is partial-thickness tear at the proximal portion of the ACL. 



POSTERIOR CRUCIATE LIGAMENT::

Intact. 



MEDIAL MENISCUS::

Complex tear involving the posterior horn of the medial meniscus is 

noted. 



LATERAL MENISCUS::

Intact. 



MEDIAL COLLATERAL LIGAMENT::

Mild grade 1-2 sprain noted at the medial collateral ligament. 



LATERAL COLLATERAL LIGAMENT COMPLEX::

Mild-to-moderate grade 2 lateral collateral ligament sprain noted. 



QUADRICEPS TENDON::

The quadriceps tendon is intact without evidence of significant 

abnormal changes. 



PATELLAR TENDON::

Heterogeneous abnormal increased signal noted at the proximal portion 

of the patellar tendon suggestive of tendinopathy 



CARTILAGE::

Mild patellar chondromalacia noted more prominent at the medial 

aspect of the patella. Small foci of cartilage defects in the femoral 

condyles. 



JOINT FLUID::

Small joint effusions. 



OSSEOUS STRUCTURES::

Intact. 



OTHER FINDINGS:

None. 



IMPRESSION:

Medial meniscus posterior horn complex tear. 



Partial thickness tear at the proximal portion of the ACL.



Small joint effusion.



Moderate lateral and mild-to-moderate medial collateral ligament 

sprain.



Mild-to-moderate proximal patellar tendinopathy.

## 2018-07-30 NOTE — VASCLAB
Date of service: 



07/28/2018



PROCEDURE:  Lower Extremity Venous Duplex Exam.



HISTORY:

r/o DVT Right leg pain, Hx of [place] injury, Chest pain.



PRIORS:

None. 



TECHNIQUE:

Bilateral common femoral, femoral, popliteal and posterior tibial, 

peroneal and great saphenous veins were evaluated. Flow was assessed 

with color Doppler, compressibility, assessment of phasic flow and 

augmentation response.



Report prepared by   Owen Gay, RVT 



FINDINGS:



RIGHT:

1. Common Femoral Vein: 



1.1. Compressibility - Fully compressible: Thrombus -  None : Flow - 

Phasic: Augmentation -Normal: Reflux - .



2. Femoral Vein:



2.1. Compressibility - Fully compressible: Thrombus -  None : Flow - 

Phasic: Augmentation -Normal: Reflux - .



3. Popliteal Vein: 



3.1. Compressibility - Fully compressible: Thrombus - None :  Flow - 

Phasic: Augmentation -Normal: Reflux - .



4. Posterior Tibial Vein: 



4.1. Compressibility - Fully compressible: Thrombus -  None: Flow - : 

Augmentation -: Reflux - .



5. Peroneal Vein:



5.1. Compressibility - Fully compressible: Thrombus -  None: Flow - : 

Augmentation -: Reflux - .



6. Great Saphenous Vein:

6.1. Compressibility - Fully compressible: Thrombus - None: Flow - 

Phasic: Augmentation - : Reflux - .





LEFT:

1. Common Femoral Vein:



1.1.  Compressibility - Fully compressible: Thrombus -  None: Flow - 

Phasic: Augmentation -Normal: Reflux - .



2. Femoral Vein:



2.1.  Compressibility - Fully compressible: Thrombus -  None: Flow - 

Phasic: Augmentation -Normal: Reflux - .



3. Popliteal Vein:



3.1.  Compressibility - Fully compressible: Thrombus -  None : Flow - 

Phasic: Augmentation -Normal: Reflux - .



4. Posterior Tibial Vein:



4.1.  Compressibility - Fully compressible: Thrombus -  : Flow - : 

Augmentation -: Reflux - .



5. Peroneal Vein:



5.1.  Compressibility - Fully compressible: Thrombus -  : Flow - : 

Augmentation -: Reflux - .



6. Great Saphenous Vein:

6.1.  Compressibility - Fully compressible: Thrombus -  None: Flow - 

Phasic: Augmentation - : Reflux - .





OTHER FINDINGS:  Right: None significant.



Left: None significant.



IMPRESSION:

Right: 



No evidence of deep or superficial vein thrombosis of the right lower 

extremity.     



Left: 



No evidence of deep or superficial vein thrombosis of the left lower 

extremity.

## 2021-03-23 NOTE — RAD
Date of service: 



07/26/2018



HISTORY:

chest pain  



COMPARISON:

1/20/2015



TECHNIQUE:

Chest PA and lateral



FINDINGS:



LUNGS:

No active pulmonary disease.



PLEURA:

No significant pleural effusion identified. No pneumothorax apparent.



CARDIOVASCULAR:

Normal.



OSSEOUS STRUCTURES:

No significant abnormalities.



VISUALIZED UPPER ABDOMEN:

Normal.



OTHER FINDINGS:

None.



IMPRESSION:

No active disease. Concentration Of Solution Injected (Mg/Ml): 10.0